# Patient Record
Sex: MALE | URBAN - METROPOLITAN AREA
[De-identification: names, ages, dates, MRNs, and addresses within clinical notes are randomized per-mention and may not be internally consistent; named-entity substitution may affect disease eponyms.]

---

## 2023-08-21 ENCOUNTER — APPOINTMENT (RX ONLY)
Dept: URBAN - METROPOLITAN AREA CLINIC 77 | Facility: CLINIC | Age: 33
Setting detail: DERMATOLOGY
End: 2023-08-21

## 2023-08-21 DIAGNOSIS — L72.8 OTHER FOLLICULAR CYSTS OF THE SKIN AND SUBCUTANEOUS TISSUE: ICD-10-CM

## 2023-08-21 DIAGNOSIS — L85.3 XEROSIS CUTIS: ICD-10-CM

## 2023-08-21 DIAGNOSIS — L64.8 OTHER ANDROGENIC ALOPECIA: ICD-10-CM

## 2023-08-21 PROCEDURE — 99203 OFFICE O/P NEW LOW 30 MIN: CPT

## 2023-08-21 PROCEDURE — ? COUNSELING

## 2023-08-21 PROCEDURE — ? TREATMENT REGIMEN

## 2023-08-21 PROCEDURE — ? PRESCRIPTION

## 2023-08-21 RX ORDER — PHARMACY COMPOUNDING ACCESSORY
EACH MISCELLANEOUS
Qty: 50 | Refills: 3 | Status: CANCELLED
Stop reason: CLARIF

## 2023-08-21 RX ORDER — PHARMACY COMPOUNDING ACCESSORY
EACH MISCELLANEOUS
Qty: 50 | Refills: 3 | Status: ERX | COMMUNITY
Start: 2023-08-21

## 2023-08-21 RX ADMIN — Medication: at 00:00

## 2023-08-21 ASSESSMENT — LOCATION DETAILED DESCRIPTION DERM
LOCATION DETAILED: LEFT SUPERIOR FOREHEAD
LOCATION DETAILED: RIGHT ELBOW
LOCATION DETAILED: LEFT ELBOW

## 2023-08-21 ASSESSMENT — LOCATION ZONE DERM
LOCATION ZONE: ARM
LOCATION ZONE: FACE

## 2023-08-21 ASSESSMENT — LOCATION SIMPLE DESCRIPTION DERM
LOCATION SIMPLE: LEFT ELBOW
LOCATION SIMPLE: LEFT FOREHEAD
LOCATION SIMPLE: RIGHT ELBOW

## 2023-08-21 NOTE — PROCEDURE: MIPS QUALITY
Quality 130: Documentation Of Current Medications In The Medical Record: Current Medications Documented
Quality 110: Preventive Care And Screening: Influenza Immunization: Influenza Immunization not Administered for Documented Reasons.
Detail Level: Detailed
Quality 226: Preventive Care And Screening: Tobacco Use: Screening And Cessation Intervention: Patient screened for tobacco use and is an ex/non-smoker
Quality 111:Pneumonia Vaccination Status For Older Adults: Patient did not receive any pneumococcal conjugate or polysaccharide vaccine on or after their 60th birthday and before the end of the measurement period

## 2023-08-21 NOTE — PROCEDURE: TREATMENT REGIMEN
Detail Level: Zone
Plan: Location: Scalp\\nPrescribe:\\n                 Minoxidil 10%, latanoprost 0.01%, finasteride 0.1%, biotin 0.2%\\n\\nPt is here for hair loss and feels his hair thinning out. \\nPt states is using OTC products minoxidil and is here for further evaluation and treatment \\nPt discussed that it has been gradually falling out.\\nHad a long discussion with patient about male/female pattern baldness and the best treatment options to prevent further hair loss. \\n\\n\\n\\nWill prescribe the Finasteride compound drops to help stimulate hair growth.\\nDiscussed with patient that Finasteride blocks 5 alpha reductase and decreases levels of Dihydrotestosterone levels to prevent miniaturization of the hair follicle.\\nDiscussed that all the side effects are temporary and if they do occur we can reduce the dose of the medication or stop it----no evidence of increased incidence of permanent impotence.\\n\\n\\n\\nEducated patient on side effects/risks from medication and what to expect from taking the oral medication and applying topical medication\\nF/u as needed
Plan: Location: L frontal scalp \\nTreatment: pt will schedule exc\\n\\nDiscussed with patient that the lesion is a benign sac filled with keratinized debris.\\nDiscussed with patient if they find it painful or bothersome, can excise.\\nF/u as needed.

## 2023-12-08 ENCOUNTER — APPOINTMENT (RX ONLY)
Dept: URBAN - METROPOLITAN AREA CLINIC 77 | Facility: CLINIC | Age: 33
Setting detail: DERMATOLOGY
End: 2023-12-08

## 2023-12-08 DIAGNOSIS — L64.8 OTHER ANDROGENIC ALOPECIA: ICD-10-CM

## 2023-12-08 DIAGNOSIS — L72.8 OTHER FOLLICULAR CYSTS OF THE SKIN AND SUBCUTANEOUS TISSUE: ICD-10-CM

## 2023-12-08 DIAGNOSIS — L85.3 XEROSIS CUTIS: ICD-10-CM

## 2023-12-08 PROCEDURE — ? PRESCRIPTION

## 2023-12-08 PROCEDURE — 99213 OFFICE O/P EST LOW 20 MIN: CPT

## 2023-12-08 PROCEDURE — ? COUNSELING

## 2023-12-08 PROCEDURE — ? TREATMENT REGIMEN

## 2023-12-08 RX ORDER — PHARMACY COMPOUNDING ACCESSORY
EACH MISCELLANEOUS
Qty: 50 | Refills: 0 | Status: ERX

## 2023-12-08 ASSESSMENT — LOCATION SIMPLE DESCRIPTION DERM
LOCATION SIMPLE: LEFT ELBOW
LOCATION SIMPLE: LEFT FOREHEAD
LOCATION SIMPLE: RIGHT ELBOW

## 2023-12-08 ASSESSMENT — LOCATION ZONE DERM
LOCATION ZONE: ARM
LOCATION ZONE: FACE

## 2023-12-08 NOTE — PROCEDURE: TREATMENT REGIMEN
Detail Level: Zone
Plan: Location: Scalp\\nPrescribe:\\n                 Minoxidil 10%, latanoprost 0.01%, finasteride 0.1%, biotin 0.2%\\n\\nPt is here for hair loss follow up \\nPt states he  is using his hair drops consistently but is unsure if there is any progress\\nOverall he is here for further evaluation and treatment \\n\\n\\nAfter comparing before and after photos:\\nDiscussed with patient that his hair has definitely improved and has gotten thicker and darker \\nHad a long discussion with patient about male/female pattern baldness and the best treatment options to prevent further hair loss. \\n\\n\\n\\nDiscussed with patient to continue using the Finasteride compound drops to help stimulate hair growth.\\nDiscussed with patient that Finasteride blocks 5 alpha reductase and decreases levels of Dihydrotestosterone levels to prevent miniaturization of the hair follicle.\\nDiscussed that all the side effects are temporary and if they do occur we can reduce the dose of the medication or stop it----no evidence of increased incidence of permanent impotence.\\n\\n\\n\\nEducated patient on side effects/risks from medication and what to expect from taking the oral medication and applying topical medication\\nF/u as needed
Plan: Location: L frontal scalp \\nTreatment: pt will schedule exc\\n\\nDiscussed with patient that the lesion is a benign sac filled with keratinized debris.\\nDiscussed with patient if they find it painful or bothersome, can excise.\\nF/u as needed.

## 2024-09-10 ENCOUNTER — APPOINTMENT (RX ONLY)
Dept: URBAN - METROPOLITAN AREA CLINIC 77 | Facility: CLINIC | Age: 34
Setting detail: DERMATOLOGY
End: 2024-09-10

## 2024-09-10 DIAGNOSIS — L64.8 OTHER ANDROGENIC ALOPECIA: ICD-10-CM

## 2024-09-10 DIAGNOSIS — L85.3 XEROSIS CUTIS: ICD-10-CM

## 2024-09-10 PROCEDURE — 99213 OFFICE O/P EST LOW 20 MIN: CPT

## 2024-09-10 PROCEDURE — ? COUNSELING

## 2024-09-10 PROCEDURE — ? PRESCRIPTION

## 2024-09-10 PROCEDURE — ? TREATMENT REGIMEN

## 2024-09-10 RX ORDER — PHARMACY COMPOUNDING ACCESSORY
EACH MISCELLANEOUS
Qty: 50 | Refills: 10 | Status: ERX

## 2024-09-10 ASSESSMENT — LOCATION DETAILED DESCRIPTION DERM
LOCATION DETAILED: LEFT ELBOW
LOCATION DETAILED: RIGHT ELBOW

## 2024-09-10 ASSESSMENT — LOCATION ZONE DERM: LOCATION ZONE: ARM

## 2024-09-10 ASSESSMENT — LOCATION SIMPLE DESCRIPTION DERM
LOCATION SIMPLE: RIGHT ELBOW
LOCATION SIMPLE: LEFT ELBOW

## 2024-09-10 NOTE — PROCEDURE: TREATMENT REGIMEN
Detail Level: Zone
Plan: Location: Scalp\\nPrescribe:\\n                 Minoxidil 10%, latanoprost 0.01%, finasteride 0.1%, biotin 0.2%\\n\\nPt is here for hair loss follow up \\nPt states he  is using his hair drops consistently but is unsure if there is any progress\\nOverall he is here for further evaluation and treatment \\n\\nDiscussed with pt that after comparing before and after photos:\\nDiscussed with patient that his hair has definitely improved and has gotten thicker and darker \\nDiscussed that there is hair growth due to the presence of growing hairs on the hairline \\nHad a long discussion with patient about male/female pattern baldness and the best treatment options to prevent further hair loss. \\n\\n\\n\\nDiscussed with patient to continue using the Finasteride compound drops to help stimulate hair growth.\\nDiscussed with patient that Finasteride blocks 5 alpha reductase and decreases levels of Dihydrotestosterone levels to prevent miniaturization of the hair follicle.\\nDiscussed that all the side effects are temporary and if they do occur we can reduce the dose of the medication or stop it----no evidence of increased incidence of permanent impotence.\\n\\n\\n\\nEducated patient on side effects/risks from medication and what to expect from taking the oral medication and applying topical medication\\nF/u as needed

## 2025-09-09 ENCOUNTER — APPOINTMENT (OUTPATIENT)
Dept: URBAN - METROPOLITAN AREA CLINIC 77 | Facility: CLINIC | Age: 35
Setting detail: DERMATOLOGY
End: 2025-09-09

## 2025-09-09 DIAGNOSIS — L85.3 XEROSIS CUTIS: ICD-10-CM

## 2025-09-09 DIAGNOSIS — L64.8 OTHER ANDROGENIC ALOPECIA: ICD-10-CM

## 2025-09-09 PROCEDURE — ? COUNSELING

## 2025-09-09 PROCEDURE — ? TREATMENT REGIMEN

## 2025-09-09 PROCEDURE — ? PRESCRIPTION

## 2025-09-09 RX ORDER — PHARMACY COMPOUNDING ACCESSORY
EACH MISCELLANEOUS
Qty: 50 | Refills: 10 | Status: ERX

## 2025-09-09 ASSESSMENT — LOCATION DETAILED DESCRIPTION DERM
LOCATION DETAILED: RIGHT ELBOW
LOCATION DETAILED: LEFT ELBOW

## 2025-09-09 ASSESSMENT — LOCATION ZONE DERM: LOCATION ZONE: ARM

## 2025-09-09 ASSESSMENT — LOCATION SIMPLE DESCRIPTION DERM
LOCATION SIMPLE: LEFT ELBOW
LOCATION SIMPLE: RIGHT ELBOW